# Patient Record
Sex: FEMALE | URBAN - METROPOLITAN AREA
[De-identification: names, ages, dates, MRNs, and addresses within clinical notes are randomized per-mention and may not be internally consistent; named-entity substitution may affect disease eponyms.]

---

## 2018-12-19 ENCOUNTER — THERAPY VISIT (OUTPATIENT)
Dept: PHYSICAL THERAPY | Facility: CLINIC | Age: 73
End: 2018-12-19
Payer: MEDICARE

## 2018-12-19 DIAGNOSIS — N81.89 PELVIC FLOOR WEAKNESS: ICD-10-CM

## 2018-12-19 DIAGNOSIS — N81.11 CYSTOCELE, MIDLINE: ICD-10-CM

## 2018-12-19 DIAGNOSIS — N39.46 MIXED INCONTINENCE URGE AND STRESS (MALE)(FEMALE): ICD-10-CM

## 2018-12-19 PROCEDURE — 97110 THERAPEUTIC EXERCISES: CPT | Mod: GP | Performed by: PHYSICAL THERAPIST

## 2018-12-19 PROCEDURE — G8981 BODY POS CURRENT STATUS: HCPCS | Mod: GP | Performed by: PHYSICAL THERAPIST

## 2018-12-19 PROCEDURE — 97535 SELF CARE MNGMENT TRAINING: CPT | Mod: GP | Performed by: PHYSICAL THERAPIST

## 2018-12-19 PROCEDURE — G8982 BODY POS GOAL STATUS: HCPCS | Mod: GP | Performed by: PHYSICAL THERAPIST

## 2018-12-19 PROCEDURE — 97162 PT EVAL MOD COMPLEX 30 MIN: CPT | Mod: GP | Performed by: PHYSICAL THERAPIST

## 2018-12-19 PROCEDURE — 97112 NEUROMUSCULAR REEDUCATION: CPT | Mod: GP | Performed by: PHYSICAL THERAPIST

## 2018-12-19 NOTE — PROGRESS NOTES
[facility] Initial Evaluation  Subjective:  The history is provided by the patient. No  was used.   Mar Guzman is a 73 year old female with a pelvic dysfunction and incontinence condition.  Condition occurred with:  Insidious onset.  Condition occurred: for unknown reasons.  This is a chronic condition  Date of MD order for this episode was 11-20-18. Mar states for about 10 yrs she has had episodes where she felt pressure or prolapse in the pelvic floor and it would always resolve. Then about one year ago she got an e coli infection and spent 7 days in the hospital, she states her kidneys were full of bacteria and she was on meds for over a month. After this time the bladder really started to prolapse. Currently Mar sits on her side for PT intake and at home or in the car sits on a pillow. She does not notice improvement in the am. She does manually push it back up. Mar states she is using 3 pads per day and they are wet. During the day she feels she constantly leaks when she changes positions(sit to stand) and slightly less with walking. She uses 1 pad at night and there is slight wetness on that in the morning.   Mar states she does get a strong urgency to void(getting out of van and walking into office, or garage into house) and leaks on the way. Mar states the urine stream is not strong and she has post void dribbling.   Void times during the day-every  1 hr 45 min to 2 hrs. Night voids-ave 3x/night    Fluids-ave day 120 oz water, 20 oz herbal tea in am,8 oz juice. Mar states she eats a varied diet with whole grains.    Bowels-can have 2-3 BM's daily, does not have to strain, types 3, 4 and 6 on bristol stool chart.   Mar uses a total gym 3x/wk, 25' at a time. She states she is good about exhaling on effort with the exercises.   Mar states she gets a bladder infection about every 6-8-wks. She is using estrogen cream and this has helped her irritated skin.  Mar  "is scheduled for pelvic floor surgery on 1-17-19.    Mar states she did have internal pain with palpation exam by her MD .    Patient reports pain:  Other (pain pressure pelvic floor sitting ).    Pain is described as burning and sharp and is constant and reported as 4/10.   Pain is worse during the day.  Symptoms are exacerbated by sitting, walking, coughing, sneezing, laughing, stress and other (positional changes) and relieved by nothing.  Since onset symptoms are gradually worsening.        General health as reported by patient is good.  Pertinent medical history includes:  Fibromyalgia, multiple sclerosis, other and migraines/headaches (\"branch of MS, milder form)).  Medical allergies: yes (\"medication\").  Other surgeries include:  Other (10 yrs ago hysterectomy, 5 yrs ago vaginal surgery).  Current medications:  Other (long list includes baclofen, fluconazole, ibuprofen estrogen cream).  Current occupation is .  Patient is working in normal job without restrictions.  Primary job tasks include:  Driving, other and lifting (bending).    Barriers include:  None as reported by the patient.    Red flags:  None as reported by the patient.                        Objective:  System                                 Pelvic Dysfunction Evaluation:        Flexibility:    Tightness present at:Adductors and Iliopsoas  Tightness not present at:  Hamstrings; Piriformis or Gluteals    Abdominal Wall:  Abdominal wall pelvic: fair activation of TrA.        Pelvic Clock Exam:    Ischiocavernosis pain:  -  Bulbocavernosis pain:  -  Transverse Perineal:  -  Levator ANI:  -  Perineal Body:  -  SI Provocation:      Negative for:  SI Compression        External Assessment:  External assessment pelvic: prolapse noted at introital opening in supine , with cough out of introitus.  Skin Condition:  Atrophic    Bearing Down/Coughing:  Cystocele      Muscle Contraction/Perineal Mobility:  Substitution  Internal Assessment:  " Internal assessment pelvic: Mar tends to hold tension in the pelvic floor which is expected with the extent of her prolapse. She does have pain to palpation R>L OI. She has mm activation of the R OI with cues and needs tactile cues on L-on L mm activation is decreased.  Sensory Exam:  Normal  Contraction/Grade:  Fair squeeze, definite lift (3)    Accessory Muscle use-Gluteals:  X      SEMG Biofeedback:  Semg biofeedback pelvic: deferred to next visit.                  Additional History:        Caffeine Consumption:  0                     General     ROS    Assessment/Plan:    Patient is a 73 year old female with pelvic complaints.    Patient has the following significant findings with corresponding treatment plan.                Diagnosis 1:  Mixed incontinence, pelvic prolopse, pelvic floor weakness  Pain -  manual therapy, self management, education and home program  Decreased ROM/flexibility - manual therapy, therapeutic exercise and home program  Decreased strength - therapeutic exercise, therapeutic activities and home program  Decreased proprioception - neuro re-education, therapeutic activities and home program  Impaired muscle performance - biofeedback, neuro re-education and home program  Decreased function - therapeutic activities and home program    Therapy Evaluation Codes:   1) History comprised of:   Personal factors that impact the plan of care:      Age and Time since onset of symptoms.    Comorbidity factors that impact the plan of care are:      Bowel/bladder changes, Fibromyalgia and Multiple sclerosis.     Medications impacting care: estrogen cream.  2) Examination of Body Systems comprised of:   Body structures and functions that impact the plan of care:      Pelvis.   Activity limitations that impact the plan of care are:      Bending, Driving, Lifting, Reading/Computer work, Sitting, Squatting/kneeling, Stairs, Standing, Walking, Working, Frequency, Stress incontinence, Urgency and Urge  incontinence.  3) Clinical presentation characteristics are:   Evolving/Changing.  4) Decision-Making    Moderate complexity using standardized patient assessment instrument and/or measureable assessment of functional outcome.  Cumulative Therapy Evaluation is: Moderate complexity.    Previous and current functional limitations:  (See Goal Flow Sheet for this information)    Short term and Long term goals: (See Goal Flow Sheet for this information)     Communication ability:  Patient appears to be able to clearly communicate and understand verbal and written communication and follow directions correctly.  Treatment Explanation - The following has been discussed with the patient:   RX ordered/plan of care  Anticipated outcomes  Possible risks and side effects  This patient would benefit from PT intervention to resume normal activities.   Rehab potential is good(long term, with surgery)    Frequency:  1 X week, once daily  Duration:  for 10 weeks  Discharge Plan:  Achieve all LTG.  Independent in home treatment program.  Reach maximal therapeutic benefit.    Please refer to the daily flowsheet for treatment today, total treatment time and time spent performing 1:1 timed codes.

## 2018-12-19 NOTE — LETTER
DEPARTMENT OF HEALTH AND HUMAN SERVICES  [facility] FOR MEDICARE & MEDICAID SERVICES    PLAN/UPDATED PLAN OF PROGRESS FOR OUTPATIENT REHABILITATION    PATIENTS NAME:  Mar Guzman   : 1945  PROVIDER NUMBER:    [id number]  HICN:  [id number]  PROVIDER NAME: [facility]  MEDICAL RECORD NUMBER: [id number]   START OF CARE DATE:  SOC Date: 18   TYPE:  PT  PRIMARY/TREATMENT DIAGNOSIS: (Pertinent Medical Diagnosis)   Mixed incontinence urge and stress (male)(female)  Cystocele, midline  Pelvic floor weakness    VISITS FROM START OF CARE:  Rxs Used: 1     [facility] Initial Evaluation  Subjective:  The history is provided by the patient. No  was used.   Mar Guzman is a 73 year old female with a pelvic dysfunction and incontinence condition.  Condition occurred with:  Insidious onset.  Condition occurred: for unknown reasons.  This is a chronic condition  Date of MD order for this episode was 18. Mar states for about 10 yrs she has had episodes where she felt pressure or prolapse in the pelvic floor and it would always resolve. Then about one year ago she got an e coli infection and spent 7 days in the hospital, she states her kidneys were full of bacteria and she was on meds for over a month. After this time the bladder really started to prolapse. Currently Mar sits on her side for PT intake and at home or in the car sits on a pillow. She does not notice improvement in the am. She does manually push it back up. Mar states she is using 3 pads per day and they are wet. During the day she feels she constantly leaks when she changes positions(sit to stand) and slightly less with walking. She uses 1 pad at night and there is slight wetness on that in the morning.   Mar states she does get a strong urgency to void(getting out of van and walking into office, or garage into house) and leaks on the way. Mar states the urine stream is  "not strong and she has post void dribbling. Void times during the day-every  1 hr 45 min to 2 hrs. Night voids-ave 3x/night.  Fluids-ave day 120 oz water, 20 oz herbal tea in am,8 oz juice. Mar states she eats a varied diet with whole grains.  Bowels-can have 2-3 BM's daily, does not have to strain, types 3, 4 and 6 on bristol stool chart. Mar uses a total gym 3x/wk, 25' at a time. She states she is good about exhaling on effort with the exercises. Mar states she gets a bladder infection about every 6-8-wks. She is using estrogen cream and this has helped her irritated skin.Mar is scheduled for pelvic floor surgery on 19.  Mar states she did have internal pain with palpation exam by her MD .  Patient reports pain:  Other (pain pressure pelvic floor sitting ).  Pain is described as burning and sharp and is constant and reported as 4/10.   Pain is worse during the day.  Symptoms are exacerbated by sitting, walking, coughing, sneezing, laughing, stress and other (positional changes) and relieved by nothing.  Since onset symptoms are gradually worsening.  General health as reported by patient is good.  Pertinent medical history includes:  Fibromyalgia, multiple sclerosis, other and migraines/headaches (\"branch of MS, milder form)).  Medical allergies: yes (\"medication\").  Other surgeries include:  Other (10 yrs ago hysterectomy, 5 yrs ago vaginal surgery).  Current medications:  Other (long list includes baclofen, fluconazole, ibuprofen estrogen cream).  Current occupation is .  Patient is working in PATIENTS NAME:  Mar Guzman   : 1945    normal job without restrictions.  Primary job tasks include:  Driving, other and lifting (bending).  Barriers include:  None as reported by the patient.  Red flags:  None as reported by the patient.                Objective:  Pelvic Dysfunction Evaluation:    Flexibility:    Tightness present at:Adductors and Iliopsoas  Tightness not present at:  " Hamstrings; Piriformis or Gluteals  Abdominal Wall:  Abdominal wall pelvic: fair activation of TrA.  Pelvic Clock Exam:    Ischiocavernosis pain:  -  Bulbocavernosis pain:  -  Transverse Perineal:  -  Levator ANI:  -  Perineal Body:  -  SI Provocation:    Negative for:  SI Compression  External Assessment:  External assessment pelvic: prolapse noted at introital opening in supine , with cough out of introitus.  Skin Condition:  Atrophic  Bearing Down/Coughing:  Cystocele  Muscle Contraction/Perineal Mobility:  Substitution  Internal Assessment:  Internal assessment pelvic: Mar tends to hold tension in the pelvic floor which is expected with the extent of her prolapse. She does have pain to palpation R>L OI. She has mm activation of the R OI with cues and needs tactile cues on L-on L mm activation is decreased.  Sensory Exam:  Normal  Contraction/Grade:  Fair squeeze, definite lift (3)  Accessory Muscle use-Gluteals:  X  SEMG Biofeedback:  Semg biofeedback pelvic: deferred to next visit.  Additional History:  Caffeine Consumption:  0        Assessment/Plan:    Patient is a 73 year old female with pelvic complaints.    Patient has the following significant findings with corresponding treatment plan.                Diagnosis 1:  Mixed incontinence, pelvic prolopse, pelvic floor weakness  Pain -  manual therapy, self management, education and home program  Decreased ROM/flexibility - manual therapy, therapeutic exercise and home program  Decreased strength - therapeutic exercise, therapeutic activities and home program  Decreased proprioception - neuro re-education, therapeutic activities and home program  Impaired muscle performance - biofeedback, neuro re-education and home program  Decreased function - therapeutic activities and home program    Therapy Evaluation Codes:   1) History comprised of:   Personal factors that impact the plan of care:      Age and Time since onset of symptoms.   PATIENTS NAME:  Thomas  Mar   : 1945   Comorbidity factors that impact the plan of care are:      Bowel/bladder changes, Fibromyalgia and Multiple sclerosis.     Medications impacting care: estrogen cream.  2) Examination of Body Systems comprised of:   Body structures and functions that impact the plan of care:      Pelvis.   Activity limitations that impact the plan of care are:      Bending, Driving, Lifting, Reading/Computer work, Sitting, Squatting/kneeling, Stairs, Standing, Walking, Working, Frequency, Stress incontinence, Urgency and Urge incontinence.  3) Clinical presentation characteristics are:   Evolving/Changing.  4) Decision-Making    Moderate complexity using standardized patient assessment instrument and/or measureable assessment of functional outcome.  Cumulative Therapy Evaluation is: Moderate complexity.  Previous and current functional limitations:  (See Goal Flow Sheet for this information)    Short term and Long term goals: (See Goal Flow Sheet for this information)   Communication ability:  Patient appears to be able to clearly communicate and understand verbal and written communication and follow directions correctly.  Treatment Explanation - The following has been discussed with the patient:   RX ordered/plan of care  Anticipated outcomes  Possible risks and side effects  This patient would benefit from PT intervention to resume normal activities.   Rehab potential is good(long term, with surgery)  Frequency:  1 X week, once daily  Duration:  for 10 weeks  Discharge Plan:  Achieve all LTG.  Independent in home treatment program.  Reach maximal therapeutic benefit.  Please refer to the daily flowsheet for treatment today, total treatment time and time spent performing 1:1 timed codes.         Caregiver Signature/Credentials _____________________________ Date ________      Treating Provider: Klarissa Muñoz, PT   I have reviewed and certified the need for these services and plan of treatment while under my  "care.        PHYSICIAN'S SIGNATURE:   _________________________________________  Date___________   Byron Kelly M.D.    Certification period:  Beginning of Cert date period: 12/19/18 to  End of Cert period date: 03/18/19   Functional Level Progress Report: Please see attached \"Goal Flow sheet for Functional level.\"  ____X____ Continue Services or       ________ DC Services              Service dates: From  SOC Date: 12/19/18 date to present                         "

## 2018-12-26 ENCOUNTER — THERAPY VISIT (OUTPATIENT)
Dept: PHYSICAL THERAPY | Facility: CLINIC | Age: 73
End: 2018-12-26
Payer: MEDICARE

## 2018-12-26 DIAGNOSIS — N81.11 CYSTOCELE, MIDLINE: ICD-10-CM

## 2018-12-26 DIAGNOSIS — N39.46 MIXED INCONTINENCE URGE AND STRESS (MALE)(FEMALE): ICD-10-CM

## 2018-12-26 DIAGNOSIS — N81.89 PELVIC FLOOR WEAKNESS: ICD-10-CM

## 2018-12-26 PROCEDURE — 97110 THERAPEUTIC EXERCISES: CPT | Mod: GP | Performed by: PHYSICAL THERAPIST

## 2018-12-26 PROCEDURE — G8983 BODY POS D/C STATUS: HCPCS | Mod: GP | Performed by: PHYSICAL THERAPIST

## 2018-12-26 PROCEDURE — G8982 BODY POS GOAL STATUS: HCPCS | Mod: GP | Performed by: PHYSICAL THERAPIST

## 2018-12-26 PROCEDURE — 97112 NEUROMUSCULAR REEDUCATION: CPT | Mod: GP | Performed by: PHYSICAL THERAPIST

## 2019-03-04 ENCOUNTER — THERAPY VISIT (OUTPATIENT)
Dept: PHYSICAL THERAPY | Facility: CLINIC | Age: 74
End: 2019-03-04
Payer: COMMERCIAL

## 2019-03-04 DIAGNOSIS — M62.89 PELVIC FLOOR DYSFUNCTION IN FEMALE: ICD-10-CM

## 2019-03-04 DIAGNOSIS — N39.46 MIXED INCONTINENCE URGE AND STRESS (MALE)(FEMALE): ICD-10-CM

## 2019-03-04 DIAGNOSIS — N39.3 FEMALE STRESS INCONTINENCE: ICD-10-CM

## 2019-03-04 DIAGNOSIS — N81.11 CYSTOCELE, MIDLINE: ICD-10-CM

## 2019-03-04 DIAGNOSIS — N81.89 PELVIC FLOOR WEAKNESS: ICD-10-CM

## 2019-03-04 PROCEDURE — 97161 PT EVAL LOW COMPLEX 20 MIN: CPT | Mod: GP | Performed by: PHYSICAL THERAPIST

## 2019-03-04 PROCEDURE — G8981 BODY POS CURRENT STATUS: HCPCS | Mod: GP | Performed by: PHYSICAL THERAPIST

## 2019-03-04 PROCEDURE — 97112 NEUROMUSCULAR REEDUCATION: CPT | Mod: GP | Performed by: PHYSICAL THERAPIST

## 2019-03-04 PROCEDURE — G8982 BODY POS GOAL STATUS: HCPCS | Mod: GP | Performed by: PHYSICAL THERAPIST

## 2019-03-04 NOTE — LETTER
DEPARTMENT OF HEALTH AND HUMAN SERVICES  [facility]    PLAN/UPDATED PLAN OF PROGRESS FOR OUTPATIENT REHABILITATION    PATIENTS NAME:  Mar Guzman   : 1945  PROVIDER NUMBER:    [id number]  HICN:   [id number]  PROVIDER NAME: [facility]  MEDICAL RECORD NUMBER: [id number]   START OF CARE DATE:  SOC Date: 19   TYPE:  PT  PRIMARY/TREATMENT DIAGNOSIS: (Pertinent Medical Diagnosis)  Mixed incontinence urge and stress (male)(female)  Cystocele, midline  Pelvic floor weakness  Female stress incontinence  Pelvic floor dysfunction in female  VISITS FROM START OF CARE:  Rxs Used: 1    [facility] Initial Evaluation  Subjective:  The history is provided by the patient. No  was used.   Mar Guzman is a 73 year old female with a incontinence and pelvic dysfunction condition.  Condition occurred with:  Insidious onset.  Condition occurred: for unknown reasons.  This is a chronic condition  Date of MD order for this episode was 3-4-19(6 week post surgical check up). Mar states she had her bladder surgery on 19. She had a significant prolapse, urgency and urinary leakage. Mar states she is much improved since her surgery except for some urinary leakage. She is using 1 pad per day and it is damp. She does not wear a pad at night and is not wet. On occasion she can have urgency in the morning(controllable). Day urge is under control. States the urine stream seems normal and feels she empties completely. Void times day are every 1 3/4 hrs, at night she wakes 0-1x to void. Fluid intake-herbal tea 60 oz, water 60 oz, 1 glass of juice. Rare coffee. BM daily, easy to pass. Has leaks with sit to stand and taking a couple steps, turning to get in/out of car and sometimes with cough/sneeze. States the pain she had prior to surgery is gone. Patient reports pain:  N/a.   Symptoms are exacerbated by sneezing, coughing  "and other (sit to stand, first steps, in/out of car) and relieved by nothing.  Since onset symptoms are gradually improving.    Previous treatment includes physical therapy (pre surgical).  General health as reported by patient is good.  Pertinent medical history includes:  Migraines/headaches, fibromyalgia and other (mild form of MS).  Medical allergies: yes (\"medication\").  Other surgeries include:  Other (10 yrs ago hysterectomy, 5 yrs ago vaginal).  Current medications:  Other (baclofen, fluconazole, ibuprofen, extrogen).  Current occupation is .  Patient is working in normal job without restrictions.  Primary job tasks include:  Driving and lifting.  Barriers include:  None as reported by the patient.  Red flags:  None as reported by the patient.    Pelvic Dysfunction Evaluation:    Abdominal Wall:  Abdominal wall pelvic: fair to good TrA activation.  Scar Mobility:  Scope marks healing and mobile except slight adherrance R upper scope incision  Pelvic Clock Exam:    Ischiocavernosis pain:  -  PATIENTS NAME:  Mar Guzman   : 1945  Bulbocavernosis pain:  -  Transverse Perineal:  -  Levator ANI:  -  Perineal Body:  -  SI Provocation:    Positive for: ASLR  Negative for:  SI Compression  External Assessment:  External assessment pelvic: irritation of perineal tissue noted, states she is working with her Dr on this.  Skin Condition:  Irritation  Tissue Symmetry:  Normal  Introitus:  Normal  Muscle Contraction/Perineal Mobility:  Substitution  Internal Assessment:  Internal assessment pelvic: weak PFM activation and feels of reduced mm bulk R more than L pelvic diaphragn. Tends to subst with glutes and adductors for PFM activation.  Sensory Exam:  Normal  Contraction/Grade:  Fair squeeze, definite lift (3)  Accessory Muscle use-Gluteals:  X  Accessory Muscle use-Adductors:  X  SEMG Biofeedback:  Semg biofeedback pelvic: will work internally for BF(manual) until sure activating correct " mm's.  Equipment:  Pathway  Suraface electrode placement--Perianal:  X  Peak pelvic muscle contraction:  14  Sustained contraction:  Phasic avd 10.8uV, tonic 10.9 uV  EMG interpretation to fatigue:  8-10 seconds  Position:  SupineAdditional History:  Caffeine Consumption:  0         Assessment/Plan:    Patient is a 73 year old female with pelvic complaints.    Patient has the following significant findings with corresponding treatment plan.                Diagnosis 1:  Stress incontinence, pelvic floor dysfunction  Decreased strength - therapeutic exercise, therapeutic activities and home program  Decreased proprioception - neuro re-education, therapeutic activities and home program  Impaired muscle performance -biofeedback,  neuro re-education and home program  Decreased function - therapeutic activities and home program    Therapy Evaluation Codes:   1) History comprised of:   Personal factors that impact the plan of care:      Age and Time since onset of symptoms.    Comorbidity factors that impact the plan of care are:      Mar states mild form of MS.     Medications impacting care: estrogen cream and None.  2) Examination of Body Systems comprised of:   Body structures and functions that impact the plan of care:      Pelvis.   Activity limitations that impact the plan of care are:      Frequency and Stress incontinence.  PATIENTS NAME:  Mar Guzman   : 1945    3) Clinical presentation characteristics are:   Stable/Uncomplicated.  4) Decision-Making    Low complexity using standardized patient assessment instrument and/or measureable assessment of functional outcome.  Cumulative Therapy Evaluation is: Low complexity.  Previous and current functional limitations:  (See Goal Flow Sheet for this information)    Short term and Long term goals: (See Goal Flow Sheet for this information)   Communication ability:  Patient appears to be able to clearly communicate and understand verbal and written  "communication and follow directions correctly.  Treatment Explanation - The following has been discussed with the patient:   RX ordered/plan of care  Anticipated outcomes  Possible risks and side effects  This patient would benefit from PT intervention to resume normal activities.   Rehab potential is good.  Frequency:  1 X week, once daily  Duration:  for 4 weeks tapering to 2 X a month over 2 months  Discharge Plan:  Achieve all LTG.  Independent in home treatment program.  Reach maximal therapeutic benefit.  Please refer to the daily flowsheet for treatment today, total treatment time and time spent performing 1:1 timed codes.         Caregiver Signature/Credentials _____________________________ Date ________      Treating Provider: Klarissa Muñoz, PT   I have reviewed and certified the need for these services and plan of treatment while under my care.        PHYSICIAN'S SIGNATURE:   _________________________________________  Date___________   Byron Kelly M.D.    Certification period:  Beginning of Cert date period: 03/04/19 to  End of Cert period date: 06/01/19     Functional Level Progress Report: Please see attached \"Goal Flow sheet for Functional level.\"    ____X____ Continue Services or       ________ DC Services                Service dates: From  SOC Date: 03/04/19 date to present                         "

## 2019-03-04 NOTE — PROGRESS NOTES
"[place] [facility] Initial Evaluation  Subjective:  The history is provided by the patient. No  was used.   Mar Guzman is a 73 year old female with a incontinence and pelvic dysfunction condition.  Condition occurred with:  Insidious onset.  Condition occurred: for unknown reasons.  This is a chronic condition  Date of MD order for this episode was 3-4-19(6 week post surgical check up). Mar states she had her bladder surgery on 1-17-19. She had a significant prolapse, urgency and urinary leakage. Mar states she is much improved since her surgery except for some urinary leakage. She is using 1 pad per day and it is damp. She does not wear a pad at night and is not wet. On occasion she can have urgency in the morning(controllable). Day urge is under control. States the urine stream seems normal and feels she empties completely. Void times day are every 1 3/4 hrs, at night she wakes 0-1x to void. Fluid intake-herbal tea 60 oz, water 60 oz, 1 glass of juice. Rare coffee.   BM daily, easy to pass.   Has leaks with sit to stand and taking a couple steps, turning to get in/out of car and sometimes with cough/sneeze. States the pain she had prior to surgery is gone. .    Patient reports pain:  N/a.           Symptoms are exacerbated by sneezing, coughing and other (sit to stand, first steps, in/out of car) and relieved by nothing.  Since onset symptoms are gradually improving.    Previous treatment includes physical therapy (pre surgical).    General health as reported by patient is good.  Pertinent medical history includes:  Migraines/headaches, fibromyalgia and other (mild form of MS).  Medical allergies: yes (\"medication\").  Other surgeries include:  Other (10 yrs ago hysterectomy, 5 yrs ago vaginal).  Current medications:  Other (baclofen, fluconazole, ibuprofen, extrogen).  Current occupation is .  Patient is working in normal job without restrictions.  Primary job tasks " include:  Driving and lifting.    Barriers include:  None as reported by the patient.    Red flags:  None as reported by the patient.                        Objective:  System                                 Pelvic Dysfunction Evaluation:          Abdominal Wall:  Abdominal wall pelvic: fair to good TrA activation.      Scar Mobility:  Scope marks healing and mobile except slight adherrance R upper scope incision  Pelvic Clock Exam:    Ischiocavernosis pain:  -  Bulbocavernosis pain:  -  Transverse Perineal:  -  Levator ANI:  -  Perineal Body:  -  SI Provocation:    Positive for: ASLR  Negative for:  SI Compression        External Assessment:  External assessment pelvic: irritation of perineal tissue noted, states she is working with her Dr on this.  Skin Condition:  Irritation      Tissue Symmetry:  Normal  Introitus:  Normal  Muscle Contraction/Perineal Mobility:  Substitution  Internal Assessment:  Internal assessment pelvic: weak PFM activation and feels of reduced mm bulk R more than L pelvic diaphragn. Tends to subst with glutes and adductors for PFM activation.  Sensory Exam:  Normal  Contraction/Grade:  Fair squeeze, definite lift (3)    Accessory Muscle use-Gluteals:  X  Accessory Muscle use-Adductors:  X    SEMG Biofeedback:  Semg biofeedback pelvic: will work internally for BF(manual) until sure activating correct mm's.  Equipment:  Pathway    Suraface electrode placement--Perianal:  X      Peak pelvic muscle contraction:  14  Sustained contraction:  Phasic avd 10.8uV, tonic 10.9 uV  EMG interpretation to fatigue:  8-10 seconds  Position:  SupineAdditional History:        Caffeine Consumption:  0                     General     ROS    Assessment/Plan:    Patient is a 73 year old female with pelvic complaints.    Patient has the following significant findings with corresponding treatment plan.                Diagnosis 1:  Stress incontinence, pelvic floor dysfunction  Decreased strength - therapeutic  exercise, therapeutic activities and home program  Decreased proprioception - neuro re-education, therapeutic activities and home program  Impaired muscle performance -biofeedback,  neuro re-education and home program  Decreased function - therapeutic activities and home program    Therapy Evaluation Codes:   1) History comprised of:   Personal factors that impact the plan of care:      Age and Time since onset of symptoms.    Comorbidity factors that impact the plan of care are:      Mar states mild form of MS.     Medications impacting care: estrogen cream and None.  2) Examination of Body Systems comprised of:   Body structures and functions that impact the plan of care:      Pelvis.   Activity limitations that impact the plan of care are:      Frequency and Stress incontinence.  3) Clinical presentation characteristics are:   Stable/Uncomplicated.  4) Decision-Making    Low complexity using standardized patient assessment instrument and/or measureable assessment of functional outcome.  Cumulative Therapy Evaluation is: Low complexity.    Previous and current functional limitations:  (See Goal Flow Sheet for this information)    Short term and Long term goals: (See Goal Flow Sheet for this information)     Communication ability:  Patient appears to be able to clearly communicate and understand verbal and written communication and follow directions correctly.  Treatment Explanation - The following has been discussed with the patient:   RX ordered/plan of care  Anticipated outcomes  Possible risks and side effects  This patient would benefit from PT intervention to resume normal activities.   Rehab potential is good.    Frequency:  1 X week, once daily  Duration:  for 4 weeks tapering to 2 X a month over 2 months  Discharge Plan:  Achieve all LTG.  Independent in home treatment program.  Reach maximal therapeutic benefit.    Please refer to the daily flowsheet for treatment today, total treatment time and time  spent performing 1:1 timed codes.

## 2019-03-04 NOTE — PROGRESS NOTES
"Subjective:  HPI                    Objective:  System    Physical Exam    General     ROS    Assessment/Plan:    DISCHARGE REPORT    Progress reporting period is from 12-19-18 to 12-26-18, 2 visits.       SUBJECTIVE  Subjective changes noted by patient:  The following is from last visit seen. Mar cancelled her January appt due to weather.  Subjective: States she has been on her feet a lot today so is more sore. States the urinary leakage is not as bad as it was, \"is less\", \"alittle\".     Current pain level Current Pain level: 5/10.       Initial Pain level: 4/10.   Changes in function:  None  Adverse reaction to treatment or activity: None    OBJECTIVE  Changes noted in objective findings:  The following is from last visit seen  Objective: BF assessment today.  Ex done in supine due to prolapse, progressed to core strength as well and printed for Mar  Reassessement not done prior to surgery as Mar missed her last scheduled appt    ASSESSMENT/PLAN  Updated problem list and treatment plan: Diagnosis 1:  Prolapse, mixed incontinence -going on to have surgery   STG/LTGs have been met or progress has been made towards goals:  No, progress has been made on instruction in activating PF muscles and ways to reduce intra-abominal pressure  Assessment of Progress: slow progress, going on to have surgery  Self Management Plans:  Patient has been instructed in a home treatment program.  Patient  has been instructed in self management of symptoms.  Patient no longer requires PT prior to surgery  Mar continues to require the following intervention to meet STG and LTG's:  PT intervention is no longer required to meet STG/LTG.    Recommendations:  discharge from PT, having bladder surgery on 1-17-19, then will return to PT    Please refer to the daily flowsheet for treatment today, total treatment time and time spent performing 1:1 timed codes.          "

## 2019-03-04 NOTE — LETTER
[place] [facility]  [address]  [address]  [place] [address]  [phone]    2019    Re: Mar Guzman   :   1945  MRN:  [id number]   REFERRING PHYSICIAN:   Byron Kelly    [facility]    Date of Initial Evaluation:  3/4/19  Visits:  Rxs Used: 3  Reason for Referral:     Mixed incontinence urge and stress (male)(female)  Cystocele, midline  Pelvic floor weakness  Female stress incontinence  Pelvic floor dysfunction in female    EVALUATION SUMMARY    [place] [facility] Initial Evaluation  Subjective:  The history is provided by the patient. No  was used.   Mar Guzman is a 73 year old female with a incontinence and pelvic dysfunction condition.  Condition occurred with:  Insidious onset.  Condition occurred: for unknown reasons.  This is a chronic condition  Date of MD order for this episode was 3-4-19(6 week post surgical check up). Mar states she had her bladder surgery on 19. She had a significant prolapse, urgency and urinary leakage. Mar states she is much improved since her surgery except for some urinary leakage. She is using 1 pad per day and it is damp. She does not wear a pad at night and is not wet. On occasion she can have urgency in the morning(controllable). Day urge is under control. States the urine stream seems normal and feels she empties completely. Void times day are every 1 3/4 hrs, at night she wakes 0-1x to void. Fluid intake-herbal tea 60 oz, water 60 oz, 1 glass of juice. Rare coffee.   BM daily, easy to pass.   Has leaks with sit to stand and taking a couple steps, turning to get in/out of car and sometimes with cough/sneeze. States the pain she had prior to surgery is gone. .    Patient reports pain:  N/a.           Symptoms are exacerbated by sneezing, coughing and other (sit to stand, first steps, in/out of car) and relieved by nothing.  Since onset  "symptoms are gradually improving.    Previous treatment includes physical therapy (pre surgical).    General health as reported by patient is good.  Pertinent medical history includes:  Migraines/headaches, fibromyalgia and other (mild form of MS).  Medical allergies: yes (\"medication\").  Other surgeries include:  Other (10 yrs ago hysterectomy, 5 yrs ago vaginal).  Current medications:  Other (baclofen, fluconazole, ibuprofen, extrogen).  Current occupation is .  Patient is working in normal job without restrictions.  Primary job tasks include:  Driving and lifting.        Mar Guzman   :   1945    Barriers include:  None as reported by the patient.  Red flags:  None as reported by the patient.    Objective:  System  Pelvic Dysfunction Evaluation:    Abdominal Wall:  Abdominal wall pelvic: fair to good TrA activation.  Scar Mobility:  Scope marks healing and mobile except slight adherrance R upper scope incision  Pelvic Clock Exam:    Ischiocavernosis pain:  -  Bulbocavernosis pain:  -  Transverse Perineal:  -  Levator ANI:  -  Perineal Body:  -  SI Provocation:    Positive for: ASLR  Negative for:  SI Compression    External Assessment:  External assessment pelvic: irritation of perineal tissue noted, states she is working with her Dr on this.  Skin Condition:  Irritation  Tissue Symmetry:  Normal  Introitus:  Normal  Muscle Contraction/Perineal Mobility:  Substitution  Internal Assessment:  Internal assessment pelvic: weak PFM activation and feels of reduced mm bulk R more than L pelvic diaphragn. Tends to subst with glutes and adductors for PFM activation.  Sensory Exam:  Normal  Contraction/Grade:  Fair squeeze, definite lift (3)  Accessory Muscle use-Gluteals:  X  Accessory Muscle use-Adductors:  X  SEMG Biofeedback:  Semg biofeedback pelvic: will work internally for BF(manual) until sure activating correct mm's.  Equipment:  Pathway  Suraface electrode placement--Perianal:  X    Peak " pelvic muscle contraction:  14  Sustained contraction:  Phasic avd 10.8uV, tonic 10.9 uV  EMG interpretation to fatigue:  8-10 seconds  Position:  SupineAdditional History:  Caffeine Consumption:  0             Assessment/Plan:    Patient is a 73 year old female with pelvic complaints.    Patient has the following significant findings with corresponding treatment plan.                Diagnosis 1:  Stress incontinence, pelvic floor dysfunction  Decreased strength - therapeutic exercise, therapeutic activities and home program  Decreased proprioception - neuro re-education, therapeutic activities and home program  Impaired muscle performance -biofeedback,  neuro re-education and home program  Decreased function - therapeutic activities and home program   Mar Guzman   :   1945        Previous and current functional limitations:  (See Goal Flow Sheet for this information)    Short term and Long term goals: (See Goal Flow Sheet for this information)     Communication ability:  Patient appears to be able to clearly communicate and understand verbal and written communication and follow directions correctly.  Treatment Explanation - The following has been discussed with the patient:   RX ordered/plan of care  Anticipated outcomes  Possible risks and side effects  This patient would benefit from PT intervention to resume normal activities.   Rehab potential is good.    Frequency:  1 X week, once daily  Duration:  for 4 weeks tapering to 2 X a month over 2 months  Discharge Plan:  Achieve all LTG.  Independent in home treatment program.  Reach maximal therapeutic benefit.            Thank you for your referral.    INQUIRIES  Therapist:Klarissa Muñoz PT  [facility] [place] [facility]  [address]  [place] [address]  Phone: [phone]  Fax: [phone]

## 2019-03-13 ENCOUNTER — THERAPY VISIT (OUTPATIENT)
Dept: PHYSICAL THERAPY | Facility: CLINIC | Age: 74
End: 2019-03-13
Payer: COMMERCIAL

## 2019-03-13 DIAGNOSIS — N39.3 FEMALE STRESS INCONTINENCE: ICD-10-CM

## 2019-03-13 DIAGNOSIS — M62.89 PELVIC FLOOR DYSFUNCTION IN FEMALE: ICD-10-CM

## 2019-03-13 PROCEDURE — 97110 THERAPEUTIC EXERCISES: CPT | Mod: GP | Performed by: PHYSICAL THERAPIST

## 2019-03-13 PROCEDURE — 97112 NEUROMUSCULAR REEDUCATION: CPT | Mod: GP | Performed by: PHYSICAL THERAPIST

## 2019-03-20 ENCOUNTER — THERAPY VISIT (OUTPATIENT)
Dept: PHYSICAL THERAPY | Facility: CLINIC | Age: 74
End: 2019-03-20
Payer: COMMERCIAL

## 2019-03-20 DIAGNOSIS — N39.3 FEMALE STRESS INCONTINENCE: ICD-10-CM

## 2019-03-20 DIAGNOSIS — M62.89 PELVIC FLOOR DYSFUNCTION IN FEMALE: ICD-10-CM

## 2019-03-20 PROCEDURE — 97112 NEUROMUSCULAR REEDUCATION: CPT | Mod: GP | Performed by: PHYSICAL THERAPIST

## 2019-03-20 PROCEDURE — 97110 THERAPEUTIC EXERCISES: CPT | Mod: GP | Performed by: PHYSICAL THERAPIST

## 2019-03-27 ENCOUNTER — THERAPY VISIT (OUTPATIENT)
Dept: PHYSICAL THERAPY | Facility: CLINIC | Age: 74
End: 2019-03-27
Payer: COMMERCIAL

## 2019-03-27 DIAGNOSIS — M62.89 PELVIC FLOOR DYSFUNCTION IN FEMALE: ICD-10-CM

## 2019-03-27 DIAGNOSIS — N39.3 FEMALE STRESS INCONTINENCE: ICD-10-CM

## 2019-03-27 PROCEDURE — 97110 THERAPEUTIC EXERCISES: CPT | Mod: GP | Performed by: PHYSICAL THERAPIST

## 2019-03-27 PROCEDURE — 97112 NEUROMUSCULAR REEDUCATION: CPT | Mod: GP | Performed by: PHYSICAL THERAPIST

## 2019-04-17 ENCOUNTER — THERAPY VISIT (OUTPATIENT)
Dept: PHYSICAL THERAPY | Facility: CLINIC | Age: 74
End: 2019-04-17
Payer: COMMERCIAL

## 2019-04-17 DIAGNOSIS — M62.89 PELVIC FLOOR DYSFUNCTION IN FEMALE: ICD-10-CM

## 2019-04-17 DIAGNOSIS — N39.3 FEMALE STRESS INCONTINENCE: ICD-10-CM

## 2019-04-17 PROCEDURE — 97110 THERAPEUTIC EXERCISES: CPT | Mod: GP | Performed by: PHYSICAL THERAPIST

## 2019-04-17 PROCEDURE — 97112 NEUROMUSCULAR REEDUCATION: CPT | Mod: GP | Performed by: PHYSICAL THERAPIST

## 2019-04-17 NOTE — LETTER
[place] [facility]  [address]  [address]  [place] [address]  [phone]    2019    Re: Mar Guzman   :   1945  MRN:  [id number]   REFERRING PHYSICIAN:   Byron Kelly    [place] [facility]    Date of Initial Evaluation: 3/4/19  Visits:  Rxs Used: 7  Reason for Referral:     Female stress incontinence  Pelvic floor dysfunction in female    DISCHARGE REPORT    Progress reporting period is from 3-4-19 to 19, 7 visits.       SUBJECTIVE  Subjective changes noted by patient:  .  Subjective: Mar states she is doing very well. Void times are [id number] to 2 hrs. At 2 hrs she can have a very slight dribble.  This is the only time she has any leakage. 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 She states she is confident to go without a pad at all other times. Able to get in/out of vehicle without leaking, this was an issue before. Is very consistent with her exercises. 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 States she is still drinking water, juice, occasional mini 7up(6 oz)-60 oz water, 12 oz juice, 20 oz herbal tea. Sit to stand, first steps and cough/sneeze are also without leaks.      Current Pain level: 0/10.      Initial Pain level: 0/10.   Changes in function:  Yes (See Goal flowsheet attached for changes in current functional level)  Adverse reaction to treatment or activity: None    OBJECTIVE  Changes noted in objective findings:  Yes,   Objective: Mar is able to activate deep pelvic floor muscles as well as more superficial. She does still overuse the abdominals but can reduce with cues. Coordination/proprioception and endurance improved of the PFM.  Endurance for a contraction 8-10 sec. Phasic and tonic BF ave 15uV.(improved from 10)                  Mar Thomas   :    1945        ASSESSMENT/PLAN  Updated problem list and treatment plan: Diagnosis 1:  Stress incontinenc-continue with HEP    STG/LTGs have been met or progress has been made towards goals:  Yes (See Goal flow sheet completed today.)  Assessment of Progress: The patient's condition is improving.  Self Management Plans:  Patient is independent in a home treatment program.  Patient is independent in self management of symptoms.  I have re-evaluated this patient and find that the nature, scope, duration and intensity of the therapy is appropriate for the medical condition of the patient.  Mar continues to require the following intervention to meet STG and LTG's:  PT intervention is no longer required to meet STG/LTG.    Recommendations:  This patient is ready to be discharged from therapy and continue their home treatment program.  Mar is very pleased with her progress, she will f/u with Dr Kelly again in June.               Thank you for your referral.    INQUIRIES  Therapist:  Klarissa Muñoz, PT  [facility] [place]  [address]  [address]  [place] [address]  Phone: [phone]  Fax: [phone]

## 2019-04-18 NOTE — PROGRESS NOTES
Subjective:  HPI                    Objective:  System    Physical Exam    General     ROS    Assessment/Plan:    DISCHARGE REPORT    Progress reporting period is from 3-4-19 to 4-17-19, 7 visits.       SUBJECTIVE  Subjective changes noted by patient:  .  Subjective: Mar states she is doing very well. Void times are [id number] to 2 hrs. At 2 hrs she can have a very slight dribble.  This is the only time she has any leakage. 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 She states she is confident to go without a pad at all other times. Able to get in/out of vehicle without leaking, this was an issue before. Is very consistent with her exercises. 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 States she is still drinking water, juice, occasional mini 7up(6 oz)-60 oz water, 12 oz juice, 20 oz herbal tea. Sit to stand, first steps and cough/sneeze are also without leaks.      Current Pain level: 0/10.      Initial Pain level: 0/10.   Changes in function:  Yes (See Goal flowsheet attached for changes in current functional level)  Adverse reaction to treatment or activity: None    OBJECTIVE  Changes noted in objective findings:  Yes,   Objective: Mar is able to activate deep pelvic floor muscles as well as more superficial. She does still overuse the abdominals but can reduce with cues. Coordination/proprioception and endurance improved of the PFM.  Endurance for a contraction 8-10 sec. Phasic and tonic BF ave 15uV.(improved from 10)     ASSESSMENT/PLAN  Updated problem list and treatment plan: Diagnosis 1:  Stress incontinenc-continue with HEP    STG/LTGs have been met or progress has been made towards goals:  Yes (See Goal flow sheet completed today.)  Assessment of Progress: The patient's condition is improving.  Self Management Plans:  Patient is independent in a home treatment program.  Patient is independent in self  management of symptoms.  I have re-evaluated this patient and find that the nature, scope, duration and intensity of the therapy is appropriate for the medical condition of the patient.  Mar continues to require the following intervention to meet STG and LTG's:  PT intervention is no longer required to meet STG/LTG.    Recommendations:  This patient is ready to be discharged from therapy and continue their home treatment program.  Mar is very pleased with her progress, she will f/u with Dr Kelly again in June.     Please refer to the daily flowsheet for treatment today, total treatment time and time spent performing 1:1 timed codes.

## 2021-05-25 ENCOUNTER — RECORDS - HEALTHEAST (OUTPATIENT)
Dept: ADMINISTRATIVE | Facility: CLINIC | Age: 76
End: 2021-05-25